# Patient Record
Sex: FEMALE | Race: BLACK OR AFRICAN AMERICAN | Employment: UNEMPLOYED | URBAN - METROPOLITAN AREA
[De-identification: names, ages, dates, MRNs, and addresses within clinical notes are randomized per-mention and may not be internally consistent; named-entity substitution may affect disease eponyms.]

---

## 2018-03-20 ENCOUNTER — HOSPITAL ENCOUNTER (OUTPATIENT)
Dept: MRI IMAGING | Age: 33
Discharge: HOME | End: 2018-03-22
Payer: COMMERCIAL

## 2018-03-20 DIAGNOSIS — Z80.3 FAMILY HISTORY OF BREAST CANCER: ICD-10-CM

## 2018-03-20 DIAGNOSIS — R92.2 DENSE BREAST TISSUE ON MAMMOGRAM: ICD-10-CM

## 2018-03-20 DIAGNOSIS — Z80.41 FAMILY HISTORY OF OVARIAN CANCER: ICD-10-CM

## 2018-03-20 PROCEDURE — A9577 INJ MULTIHANCE: HCPCS | Performed by: RADIOLOGY

## 2018-03-20 PROCEDURE — C8908 MRI W/O FOL W/CONT, BREAST,: HCPCS

## 2018-03-20 PROCEDURE — 6360000004 HC RX CONTRAST MEDICATION: Performed by: RADIOLOGY

## 2018-03-20 RX ADMIN — GADOBENATE DIMEGLUMINE 20 ML: 529 INJECTION, SOLUTION INTRAVENOUS at 13:31

## 2018-03-27 ENCOUNTER — TELEPHONE (OUTPATIENT)
Dept: BREAST CENTER | Age: 33
End: 2018-03-27

## 2018-03-27 DIAGNOSIS — R92.2 DENSE BREAST TISSUE ON MAMMOGRAM: ICD-10-CM

## 2018-03-27 DIAGNOSIS — Z91.89 AT HIGH RISK FOR BREAST CANCER: Primary | ICD-10-CM

## 2018-03-27 DIAGNOSIS — Z80.3 FAMILY HISTORY OF BREAST CANCER IN FIRST DEGREE RELATIVE: ICD-10-CM

## 2018-03-27 NOTE — TELEPHONE ENCOUNTER
Reviewed breast MRI report with Silke Sampson NP. Patient will have a mammogram in 6 months as recommended on the breast MRI. Discussed results and plan with patient. She verbalizes her understanding. Mammogram will be scheduled in September 2018.

## 2018-04-12 ENCOUNTER — NURSE ONLY (OUTPATIENT)
Dept: CARDIOLOGY CLINIC | Age: 33
End: 2018-04-12

## 2018-04-12 DIAGNOSIS — R00.2 HEART PALPITATIONS: Primary | ICD-10-CM

## 2018-05-03 DIAGNOSIS — R00.2 HEART PALPITATIONS: ICD-10-CM

## 2018-05-17 ENCOUNTER — OFFICE VISIT (OUTPATIENT)
Dept: CARDIOLOGY CLINIC | Age: 33
End: 2018-05-17
Payer: COMMERCIAL

## 2018-05-17 VITALS
HEIGHT: 62 IN | BODY MASS INDEX: 26.24 KG/M2 | WEIGHT: 142.6 LBS | HEART RATE: 97 BPM | RESPIRATION RATE: 16 BRPM | DIASTOLIC BLOOD PRESSURE: 62 MMHG | SYSTOLIC BLOOD PRESSURE: 108 MMHG

## 2018-05-17 DIAGNOSIS — R00.2 PALPITATIONS: Primary | ICD-10-CM

## 2018-05-17 DIAGNOSIS — R07.89 ATYPICAL CHEST PAIN: ICD-10-CM

## 2018-05-17 PROBLEM — I26.99 ACUTE PULMONARY EMBOLISM (HCC): Status: ACTIVE | Noted: 2018-05-17

## 2018-05-17 PROCEDURE — 99244 OFF/OP CNSLTJ NEW/EST MOD 40: CPT | Performed by: INTERNAL MEDICINE

## 2018-05-17 PROCEDURE — 93000 ELECTROCARDIOGRAM COMPLETE: CPT | Performed by: INTERNAL MEDICINE

## 2018-06-20 ENCOUNTER — HOSPITAL ENCOUNTER (OUTPATIENT)
Age: 33
Discharge: HOME | End: 2018-06-22
Payer: COMMERCIAL

## 2018-06-20 LAB
ALBUMIN SERPL-MCNC: 4.2 G/DL (ref 3.5–5.2)
ALP BLD-CCNC: 48 U/L (ref 35–104)
ALT SERPL-CCNC: 16 U/L (ref 0–32)
ANION GAP SERPL CALCULATED.3IONS-SCNC: 15 MMOL/L (ref 7–16)
AST SERPL-CCNC: 14 U/L (ref 0–31)
BASOPHILS ABSOLUTE: 0.06 E9/L (ref 0–0.2)
BASOPHILS RELATIVE PERCENT: 1.1 % (ref 0–2)
BILIRUB SERPL-MCNC: <0.2 MG/DL (ref 0–1.2)
BUN BLDV-MCNC: 9 MG/DL (ref 6–20)
CALCIUM SERPL-MCNC: 9 MG/DL (ref 8.6–10.2)
CHLORIDE BLD-SCNC: 102 MMOL/L (ref 98–107)
CHOLESTEROL, TOTAL: 159 MG/DL (ref 0–199)
CO2: 23 MMOL/L (ref 22–29)
CREAT SERPL-MCNC: 0.7 MG/DL (ref 0.5–1)
EOSINOPHILS ABSOLUTE: 0.23 E9/L (ref 0.05–0.5)
EOSINOPHILS RELATIVE PERCENT: 4.2 % (ref 0–6)
GFR AFRICAN AMERICAN: >60
GFR NON-AFRICAN AMERICAN: >60 ML/MIN/1.73
GLUCOSE BLD-MCNC: 84 MG/DL (ref 74–109)
HCT VFR BLD CALC: 41.7 % (ref 34–48)
HDLC SERPL-MCNC: 41 MG/DL
HEMOGLOBIN: 13.5 G/DL (ref 11.5–15.5)
IMMATURE GRANULOCYTES #: 0.02 E9/L
IMMATURE GRANULOCYTES %: 0.4 % (ref 0–5)
LDL CHOLESTEROL CALCULATED: 100 MG/DL (ref 0–99)
LYMPHOCYTES ABSOLUTE: 1.79 E9/L (ref 1.5–4)
LYMPHOCYTES RELATIVE PERCENT: 32.4 % (ref 20–42)
MCH RBC QN AUTO: 29.9 PG (ref 26–35)
MCHC RBC AUTO-ENTMCNC: 32.4 % (ref 32–34.5)
MCV RBC AUTO: 92.3 FL (ref 80–99.9)
MONOCYTES ABSOLUTE: 0.82 E9/L (ref 0.1–0.95)
MONOCYTES RELATIVE PERCENT: 14.9 % (ref 2–12)
NEUTROPHILS ABSOLUTE: 2.6 E9/L (ref 1.8–7.3)
NEUTROPHILS RELATIVE PERCENT: 47 % (ref 43–80)
PDW BLD-RTO: 13.4 FL (ref 11.5–15)
PLATELET # BLD: 226 E9/L (ref 130–450)
PMV BLD AUTO: 10.9 FL (ref 7–12)
POTASSIUM SERPL-SCNC: 3.5 MMOL/L (ref 3.5–5)
RBC # BLD: 4.52 E12/L (ref 3.5–5.5)
SODIUM BLD-SCNC: 140 MMOL/L (ref 132–146)
TOTAL PROTEIN: 7.9 G/DL (ref 6.4–8.3)
TRIGL SERPL-MCNC: 90 MG/DL (ref 0–149)
TSH SERPL DL<=0.05 MIU/L-ACNC: 3.1 UIU/ML (ref 0.27–4.2)
VITAMIN D 25-HYDROXY: 16 NG/ML (ref 30–100)
VLDLC SERPL CALC-MCNC: 18 MG/DL
WBC # BLD: 5.5 E9/L (ref 4.5–11.5)

## 2018-06-20 PROCEDURE — 80061 LIPID PANEL: CPT

## 2018-06-20 PROCEDURE — 80053 COMPREHEN METABOLIC PANEL: CPT

## 2018-06-20 PROCEDURE — 85025 COMPLETE CBC W/AUTO DIFF WBC: CPT

## 2018-06-20 PROCEDURE — 84443 ASSAY THYROID STIM HORMONE: CPT

## 2018-06-20 PROCEDURE — 82306 VITAMIN D 25 HYDROXY: CPT

## 2018-10-09 ENCOUNTER — TELEPHONE (OUTPATIENT)
Dept: BREAST CENTER | Age: 33
End: 2018-10-09

## 2018-10-31 ENCOUNTER — TELEPHONE (OUTPATIENT)
Dept: BREAST CENTER | Age: 33
End: 2018-10-31

## 2018-12-13 ENCOUNTER — HOSPITAL ENCOUNTER (EMERGENCY)
Age: 33
Discharge: HOME | End: 2018-12-13
Payer: COMMERCIAL

## 2018-12-13 ENCOUNTER — APPOINTMENT (OUTPATIENT)
Dept: GENERAL RADIOLOGY | Age: 33
End: 2018-12-13
Payer: COMMERCIAL

## 2018-12-13 VITALS
RESPIRATION RATE: 16 BRPM | DIASTOLIC BLOOD PRESSURE: 80 MMHG | BODY MASS INDEX: 26.16 KG/M2 | TEMPERATURE: 98.2 F | SYSTOLIC BLOOD PRESSURE: 128 MMHG | OXYGEN SATURATION: 99 % | HEART RATE: 114 BPM | WEIGHT: 143 LBS

## 2018-12-13 DIAGNOSIS — R07.89 CHEST WALL PAIN: Primary | ICD-10-CM

## 2018-12-13 PROCEDURE — 71101 X-RAY EXAM UNILAT RIBS/CHEST: CPT

## 2018-12-13 PROCEDURE — 99212 OFFICE O/P EST SF 10 MIN: CPT

## 2018-12-13 RX ORDER — CYCLOBENZAPRINE HCL 10 MG
10 TABLET ORAL 2 TIMES DAILY PRN
Qty: 14 TABLET | Refills: 0 | Status: SHIPPED | OUTPATIENT
Start: 2018-12-13

## 2018-12-13 RX ORDER — PREDNISONE 10 MG/1
TABLET ORAL
Qty: 14 TABLET | Refills: 0 | Status: SHIPPED | OUTPATIENT
Start: 2018-12-13

## 2022-02-02 ENCOUNTER — HOSPITAL ENCOUNTER (EMERGENCY)
Age: 37
Discharge: HOME | End: 2022-02-02
Payer: COMMERCIAL

## 2022-02-02 ENCOUNTER — APPOINTMENT (OUTPATIENT)
Dept: GENERAL RADIOLOGY | Age: 37
End: 2022-02-02
Payer: COMMERCIAL

## 2022-02-02 VITALS
RESPIRATION RATE: 16 BRPM | HEART RATE: 91 BPM | OXYGEN SATURATION: 96 % | SYSTOLIC BLOOD PRESSURE: 130 MMHG | BODY MASS INDEX: 22.86 KG/M2 | TEMPERATURE: 99.1 F | DIASTOLIC BLOOD PRESSURE: 77 MMHG | WEIGHT: 125 LBS

## 2022-02-02 DIAGNOSIS — S80.01XA CONTUSION OF RIGHT KNEE, INITIAL ENCOUNTER: Primary | ICD-10-CM

## 2022-02-02 PROCEDURE — 99211 OFF/OP EST MAY X REQ PHY/QHP: CPT

## 2022-02-02 PROCEDURE — 73560 X-RAY EXAM OF KNEE 1 OR 2: CPT

## 2022-02-02 NOTE — ED PROVIDER NOTES
[facility]  Department of Emergency Medicine   ED  Encounter Note  Admit Date/RoomTime: 2022  5:57 PM  ED Room:   NAME: Nic Kunz  : 1985  MRN: [id number]     Chief Complaint:  Knee Injury (hit in the right knee by her tire iron which fell out of her car while she was cleaning the car about 1 PM)    [facility] is a 29 y.o. female who presents to the ED with right knee injury and pain. Patient states about 5 hours ago she was cleaning out her car. Went to pull something in the tire iron came flying out of the car. States that it hit her directly in the right knee. She presents with pain and swelling and a bruise to the right knee. States it is worse anytime she stands or walks. Symptoms are moderate in severity. She states she tried soaking the knee in the tub earlier, without much relief. ROS   Pertinent positives and negatives are stated within HPI, all other systems reviewed and are negative. Past Medical History:  has a past medical history of Abnormal Pap smear, Cancer ([address] Palpitations, Pulmonary emboli ([address] Thrombophilia ([address] Tobacco abuse, and Tobacco use disorder affecting pregnancy, antepartum. Surgical History:  has a past surgical history that includes LEEP (); Dilation & curettage (); Hysterectomy; Tonsillectomy (); other surgical history; and Bunionectomy (Left). Social History:  reports that she has been smoking cigarettes. She has a 12.00 pack-year smoking history. She has never used smokeless tobacco. She reports that she does not drink alcohol and does not use drugs. Family History: family history includes Coronary Art Dis in her father and paternal grandfather; Elevated Lipids in her father; Heart Attack in her maternal grandmother and paternal grandfather; Heart Failure in her maternal grandmother; Other in her father and mother.      Allergies: Patient has no known allergies. PHYSICAL EXAM   Oxygen Saturation Interpretation: Normal on room air analysis. ED Triage Vitals [02/02/22 1758]   BP Temp Temp src Pulse Resp SpO2 Height Weight   130/77 99.1 °F (37.3 °C) -- 91 16 96 % -- 125 lb (56.7 kg)       General:  NAD. Alert and Oriented. Well-appearing. Skin:  Warm, dry. No rashes. Head:  Normocephalic. Atraumatic. Eyes:  EOMI. Conjunctiva normal.  ENT:  Oral mucosa moist.  Airway patent. Neck:  Supple. Normal ROM. Respiratory:  No respiratory distress. No labored breathing. Lungs clear without rales, rhonchi or wheezing. Cardiovascular:  Regular rate. No Murmur. No peripheral edema. Extremities warm and good color. Extremities: Patient does have a very small bruise and localized area of swelling to the medial aspect of her right knee. There is no overall joint effusion. Patella is aligned and nontender to palpation. Flexion and extension is intact to the right knee. Back:  Normal ROM. Nontender to palpation. Neuro:  Alert and Oriented to person, place, time and situation. Normal LOC. Moves all extremities. Speech fluent. Psych:  Calm and Cooperative. Normal thought process. Normal judgement. Lab / Imaging Results   (All laboratory and radiology results have been personally reviewed by myself)  Labs:  No results found for this visit on 02/02/22. Imaging: All Radiology results interpreted by Radiologist unless otherwise noted. XR KNEE RIGHT (1-2 VIEWS)   Final Result   No acute abnormality of the knee. ED Course / Medical Decision Making   Medications - No data to display     Re-examination:  2/2/22       Time:   Patients condition . Consult(s):   None    Procedure(s):   none    MDM:   Ace wrap applied. Patient has Motrin at home she can take as needed.     Plan of Care/Counseling:  Physician Assistant on duty reviewed today's visit with the patient in addition to providing specific details for the plan of care and counseling regarding the diagnosis and prognosis. Questions are answered at this time and are agreeable with the plan. ASSESSMENT     1. Contusion of right knee, initial encounter New Problem     PLAN   Discharged home. Patient condition is good    New Medications     New Prescriptions    No medications on file     Electronically signed by BRYAN Rose   DD: 2/2/22  **This report was transcribed using voice recognition software. Every effort was made to ensure accuracy; however, inadvertent computerized transcription errors may be present.   END OF ED PROVIDER NOTE       Ga Rose  02/02/22 0966

## 2024-06-25 ENCOUNTER — TELEPHONE (OUTPATIENT)
Dept: PULMONOLOGY | Age: 39
End: 2024-06-25

## 2024-06-25 ENCOUNTER — OFFICE VISIT (OUTPATIENT)
Dept: PULMONOLOGY | Age: 39
End: 2024-06-25
Payer: COMMERCIAL

## 2024-06-25 VITALS
HEIGHT: 63 IN | TEMPERATURE: 97.9 F | DIASTOLIC BLOOD PRESSURE: 84 MMHG | BODY MASS INDEX: 25.52 KG/M2 | SYSTOLIC BLOOD PRESSURE: 142 MMHG | WEIGHT: 144 LBS | HEART RATE: 92 BPM | RESPIRATION RATE: 18 BRPM | OXYGEN SATURATION: 98 %

## 2024-06-25 DIAGNOSIS — Z86.711 HISTORY OF PULMONARY EMBOLISM: ICD-10-CM

## 2024-06-25 DIAGNOSIS — O99.330 TOBACCO USE DISORDER AFFECTING PREGNANCY, ANTEPARTUM: ICD-10-CM

## 2024-06-25 DIAGNOSIS — Z15.89 PAI-1 4G/4G GENOTYPE: ICD-10-CM

## 2024-06-25 DIAGNOSIS — Z15.89 MTHFR MUTATION: ICD-10-CM

## 2024-06-25 DIAGNOSIS — R05.3 CHRONIC COUGH: ICD-10-CM

## 2024-06-25 DIAGNOSIS — Q33.0 CONGENITAL CYSTIC DISEASE OF LUNG: Primary | ICD-10-CM

## 2024-06-25 LAB
EXPIRATORY TIME: NORMAL
FEF 25-75% %PRED-PRE: NORMAL
FEF 25-75% PRED: NORMAL
FEF 25-75-PRE: NORMAL
FEV1 %PRED-PRE: 87 %
FEV1 PRED: 2.97 L
FEV1/FVC %PRED-PRE: 88 %
FEV1/FVC PRED: 83 %
FEV1/FVC: 73 %
FEV1: 2.59 L
FVC %PRED-PRE: 98 %
FVC PRED: 3.59 L
FVC: 3.53 L
PEF %PRED-PRE: NORMAL
PEF PRED: NORMAL
PEF-PRE: NORMAL

## 2024-06-25 PROCEDURE — 94010 BREATHING CAPACITY TEST: CPT | Performed by: INTERNAL MEDICINE

## 2024-06-25 PROCEDURE — 99406 BEHAV CHNG SMOKING 3-10 MIN: CPT | Performed by: INTERNAL MEDICINE

## 2024-06-25 RX ORDER — BENZONATATE 100 MG/1
100 CAPSULE ORAL 3 TIMES DAILY PRN
Qty: 30 CAPSULE | Refills: 3 | Status: SHIPPED | OUTPATIENT
Start: 2024-06-25 | End: 2024-08-04

## 2024-06-25 RX ORDER — BUDESONIDE AND FORMOTEROL FUMARATE DIHYDRATE 160; 4.5 UG/1; UG/1
AEROSOL RESPIRATORY (INHALATION)
COMMUNITY
Start: 2024-04-26

## 2024-06-25 ASSESSMENT — PULMONARY FUNCTION TESTS
FVC_PREDICTED: 3.59
FEV1/FVC: 73
FEV1: 2.59
FEV1/FVC_PERCENT_PREDICTED_PRE: 88
FVC_PERCENT_PREDICTED_PRE: 98
FEV1/FVC_PREDICTED: 83
FEV1_PREDICTED: 2.97
FVC: 3.53
FEV1_PERCENT_PREDICTED_PRE: 87

## 2024-06-25 NOTE — TELEPHONE ENCOUNTER
Mailed letter to patient to inform her of the CT of the Chest that is scheduled for her  at [facility]  . This test is scheduled on  Wednesday, July17, 2024 at  1:30 pm. Please arrive 30 minutes prior to appointment time.    No Test Prep is needed       Mailed letter to patient to inform her of the PFT that is scheduled for her  at [facility]  . This test is scheduled on  Wednesday, July24, 2024 at  10:30 am. Please arrive 30 minutes prior to appointment time.    The prep for this test is to not have any caffeine for 24 hours prior to testing and no respiratory medications for at least 4 hours prior to testing time.

## 2024-06-25 NOTE — PROGRESS NOTES
A CT Chest were ordered as well as blood work. A four (4) week follow up was scheduled  
gasping for breath?   YES NO You have been told you snore? If yes, is your snoring disruptive to other?   YES NO You experience restless sleep?   YES NO You wake up with headache or unrefreshed?   YES NO You have trouble with memory or concentration?   YES NO You experience fatigue?   YES NO You drive drowsy or experience near car accidents due to being tired?   YES NO You struggle to stay awake during the daytime?   YES NO Do you experience difficulty with work performance due to sleepiness?       PFT: DATE 6/25/2024     PRE PRE % Z-Score POST POST% Z-Score % Change   FEV1/FVC 0.73 88%        FEV1 2.59L 87%        FVC 3.53L 98%        DLCO            Interpretation:    NIOX:    MMRC:  Dyspnea only with strenuous exercise  0   Dyspnea when hurrying or walking up a slight hill  1   Walks slower than people of the same age because of dyspnea or has to stop for breath when walking at own pace  2   Stops for breath after walking 100 yards (91 m) or after a few minutes  3   Too dyspneic to leave house or breathless when dressing  4     - [facility] Chest 3/22/2018: [facility]   No acute process    - CTA Chest 4/19/2024: [facility]  No evidence for pulmonary embolism to subsegmental level.  INterval increase in number of multiple thin-walled cysts throughout B/L lungs with upper lobe predominance, possibly related to DELATORRE or emphysema.  No intrathoracic lymphadenopathy.    ASSESSMENT:    ICD-10-CM    1. Congenital cystic disease of lung  Q33.0 Spirometry Without Bronchodilator     MISCELLANEOUS SENDOUT VEGF-D     Full PFT Study With Bronchodilator     CBC with Auto Differential     Allergen, Respiratory, Region 5 Panel     Alpha-1-Antitrypsin w Phenotype     IgE     IgG, IgA, IgM     HIV Screen     Hepatitis Panel, Acute     MISCELLANEOUS SENDOUT CD1a for Langerhans Cell Histiocytosis     MISCELLANEOUS SENDOUT FLCN Gene for Brittni Nick Marina Syndrome     Chlamydia Antibodies IgG

## 2024-06-27 ENCOUNTER — HOSPITAL ENCOUNTER (OUTPATIENT)
Age: 39
Discharge: HOME | End: 2024-06-27
Payer: COMMERCIAL

## 2024-06-27 DIAGNOSIS — Q33.0 CONGENITAL CYSTIC DISEASE OF LUNG: ICD-10-CM

## 2024-06-27 LAB
BASOPHILS # BLD: 0.1 K/UL (ref 0–0.2)
BASOPHILS NFR BLD: 1 % (ref 0–2)
CRP SERPL HS-MCNC: <3 MG/L (ref 0–5)
EOSINOPHIL # BLD: 0.14 K/UL (ref 0.05–0.5)
EOSINOPHILS RELATIVE PERCENT: 2 % (ref 0–6)
ERYTHROCYTE [DISTWIDTH] IN BLOOD BY AUTOMATED COUNT: 13.4 % (ref 11.5–15)
ERYTHROCYTE [SEDIMENTATION RATE] IN BLOOD BY WESTERGREN METHOD: 25 MM/HR (ref 0–20)
HCT VFR BLD AUTO: 39.7 % (ref 34–48)
HGB BLD-MCNC: 13.8 G/DL (ref 11.5–15.5)
IGA SERPL-MCNC: 175 MG/DL (ref 70–400)
IGG SERPL-MCNC: 1651 MG/DL (ref 700–1600)
IGM SERPL-MCNC: 119 MG/DL (ref 40–230)
IMM GRANULOCYTES # BLD AUTO: <0.03 K/UL (ref 0–0.58)
IMM GRANULOCYTES NFR BLD: 0 % (ref 0–5)
LYMPHOCYTES NFR BLD: 2.84 K/UL (ref 1.5–4)
LYMPHOCYTES RELATIVE PERCENT: 33 % (ref 20–42)
MCH RBC QN AUTO: 31.2 PG (ref 26–35)
MCHC RBC AUTO-ENTMCNC: 34.8 G/DL (ref 32–34.5)
MCV RBC AUTO: 89.8 FL (ref 80–99.9)
MONOCYTES NFR BLD: 0.8 K/UL (ref 0.1–0.95)
MONOCYTES NFR BLD: 9 % (ref 2–12)
NEUTROPHILS NFR BLD: 55 % (ref 43–80)
NEUTS SEG NFR BLD: 4.72 K/UL (ref 1.8–7.3)
PLATELET # BLD AUTO: 218 K/UL (ref 130–450)
PMV BLD AUTO: 10.9 FL (ref 7–12)
RBC # BLD AUTO: 4.42 M/UL (ref 3.5–5.5)
RHEUMATOID FACT SER NEPH-ACNC: 27 IU/ML (ref 0–13)
WBC OTHER # BLD: 8.6 K/UL (ref 4.5–11.5)

## 2024-06-27 PROCEDURE — 86631 CHLAMYDIA ANTIBODY: CPT

## 2024-06-27 PROCEDURE — 85652 RBC SED RATE AUTOMATED: CPT

## 2024-06-27 PROCEDURE — 86039 ANTINUCLEAR ANTIBODIES (ANA): CPT

## 2024-06-27 PROCEDURE — 85025 COMPLETE CBC W/AUTO DIFF WBC: CPT

## 2024-06-27 PROCEDURE — 82104 ALPHA-1-ANTITRYPSIN PHENO: CPT

## 2024-06-27 PROCEDURE — 82785 ASSAY OF IGE: CPT

## 2024-06-27 PROCEDURE — 86632 CHLAMYDIA IGM ANTIBODY: CPT

## 2024-06-27 PROCEDURE — 86003 ALLG SPEC IGE CRUDE XTRC EA: CPT

## 2024-06-27 PROCEDURE — 82103 ALPHA-1-ANTITRYPSIN TOTAL: CPT

## 2024-06-27 PROCEDURE — 86331 IMMUNODIFFUSION OUCHTERLONY: CPT

## 2024-06-27 PROCEDURE — 80074 ACUTE HEPATITIS PANEL: CPT

## 2024-06-27 PROCEDURE — 86606 ASPERGILLUS ANTIBODY: CPT

## 2024-06-27 PROCEDURE — 83516 IMMUNOASSAY NONANTIBODY: CPT

## 2024-06-27 PROCEDURE — 86140 C-REACTIVE PROTEIN: CPT

## 2024-06-27 PROCEDURE — 87389 HIV-1 AG W/HIV-1&-2 AB AG IA: CPT

## 2024-06-27 PROCEDURE — 86038 ANTINUCLEAR ANTIBODIES: CPT

## 2024-06-27 PROCEDURE — 86036 ANCA SCREEN EACH ANTIBODY: CPT

## 2024-06-27 PROCEDURE — 36415 COLL VENOUS BLD VENIPUNCTURE: CPT

## 2024-06-27 PROCEDURE — 86431 RHEUMATOID FACTOR QUANT: CPT

## 2024-06-27 PROCEDURE — 82784 ASSAY IGA/IGD/IGG/IGM EACH: CPT

## 2024-06-28 LAB
ANA SER QL IA: NEGATIVE
HAV IGM SERPL QL IA: NONREACTIVE
HBV CORE IGM SERPL QL IA: NONREACTIVE
HBV SURFACE AG SERPL QL IA: NONREACTIVE
HCV AB SERPL QL IA: NONREACTIVE
HIV 1+2 AB+HIV1 P24 AG SERPL QL IA: NONREACTIVE
SEND OUT REPORT: NORMAL
TEST NAME: NORMAL

## 2024-06-29 LAB
A ALTERNATA IGE QN: NORMAL KU/L (ref 0–0.34)
A FUMIGATUS IGE QN: NORMAL KU/L (ref 0–0.34)
ALLERGEN BIRCH IGE: NORMAL KU/L (ref 0–0.34)
BERMUDA GRASS IGE QN: NORMAL KU/L (ref 0–0.34)
BOXELDER IGE QN: NORMAL KU/L (ref 0–0.34)
C HERBARUM IGE QN: NORMAL KUL/L (ref 0–0.34)
CALIF WALNUT POLN IGE QN: NORMAL KU/L (ref 0–0.34)
CAT DANDER IGE QN: NORMAL KU/L (ref 0–0.34)
CMN PIGWEED IGE QN: NORMAL KU/L (ref 0–0.34)
COMMON RAGWEED IGE QN: NORMAL KU/L (ref 0–0.34)
COTTONWOOD IGE QN: NORMAL KU/L (ref 0–0.34)
D FARINAE IGE QN: NORMAL KU/L (ref 0–0.34)
D PTERONYSS IGE QN: NORMAL KU/L (ref 0–0.34)
DOG DANDER IGE QN: NORMAL KU/L (ref 0–0.34)
IGE SERPL-ACNC: 18 IU/ML (ref 0–100)
LONDON PLANE IGE QN: NORMAL KU/L (ref 0–0.34)
M RACEMOSUS IGE QN: NORMAL KU/L (ref 0–0.34)
MOUSE EPITH IGE QN: NORMAL KU/L (ref 0–0.34)
MT JUNIPER IGE QN: NORMAL KU/L (ref 0–0.34)
P NOTATUM IGE QN: NORMAL KU/L (ref 0–0.34)
PECAN/HICK TREE IGE QN: NORMAL KU/L (ref 0–0.34)
ROACH IGE QN: NORMAL KU/L (ref 0–0.34)
SALTWORT IGE QN: NORMAL KU/L (ref 0–0.34)
SEND OUT REPORT: NORMAL
SHEEP SORREL IGE QN: NORMAL KU/L (ref 0–0.34)
TEST NAME: NORMAL
TIMOTHY IGE QN: NORMAL KU/L (ref 0–0.34)
WHITE ASH IGE QN: NORMAL KU/L (ref 0–0.34)
WHITE ELM IGE QN: NORMAL KU/L (ref 0–0.34)
WHITE MULBERRY IGE QN: NORMAL KU/L (ref 0–0.34)
WHITE OAK IGE QN: NORMAL KU/L (ref 0–0.34)

## 2024-07-01 LAB
ALPHA-1 ANTITRYPSIN PHENOTYPE: NORMAL
ALPHA-1 ANTITRYPSIN: 160 MG/DL (ref 90–200)
ANCA AB PATTERN SER IF-IMP: NORMAL
ANCA IGG TITR SER IF: NORMAL {TITER}
CHLAMYDIA PNEUMONIAE IGG ANTIBODY: ABNORMAL
CHLAMYDIA PNEUMONIAE IGM ANTIBODY: ABNORMAL
CHLAMYDIA PSITTACI IGG ANTIBODY: ABNORMAL
CHLAMYDIA PSITTACI IGM ANTIBODY: ABNORMAL
CHLAMYDIA TRACHOMATIS IGG ANTIBODY: ABNORMAL
CHLAMYDIA TRACHOMATIS IGM ANTIBODY: ABNORMAL
DEPRECATED S PNEUM 1 IGG SER-MCNC: 0 AU/ML (ref 0–19)
SERINE PROTEASE 3, IGG: 0 AU/ML (ref 0–19)

## 2024-07-02 LAB
A ALTERNATA IGE QN: <0.1 KU/L (ref 0–0.34)
A FUMIGATUS IGE QN: <0.1 KU/L (ref 0–0.34)
ALLERGEN BIRCH IGE: <0.1 KU/L (ref 0–0.34)
BERMUDA GRASS IGE QN: <0.1 KU/L (ref 0–0.34)
BOXELDER IGE QN: <0.1 KU/L (ref 0–0.34)
C HERBARUM IGE QN: <0.1 KUL/L (ref 0–0.34)
CALIF WALNUT POLN IGE QN: <0.1 KU/L (ref 0–0.34)
CAT DANDER IGE QN: <0.1 KU/L (ref 0–0.34)
CMN PIGWEED IGE QN: <0.1 KU/L (ref 0–0.34)
COMMON RAGWEED IGE QN: <0.1 KU/L (ref 0–0.34)
COTTONWOOD IGE QN: <0.1 KU/L (ref 0–0.34)
D FARINAE IGE QN: <0.1 KU/L (ref 0–0.34)
D PTERONYSS IGE QN: <0.1 KU/L (ref 0–0.34)
DOG DANDER IGE QN: <0.1 KU/L (ref 0–0.34)
IGE SERPL-ACNC: 18 IU/ML (ref 0–100)
LONDON PLANE IGE QN: <0.1 KU/L (ref 0–0.34)
M RACEMOSUS IGE QN: <0.1 KU/L (ref 0–0.34)
MOUSE EPITH IGE QN: <0.1 KU/L (ref 0–0.34)
MT JUNIPER IGE QN: <0.1 KU/L (ref 0–0.34)
P NOTATUM IGE QN: <0.1 KU/L (ref 0–0.34)
PECAN/HICK TREE IGE QN: <0.1 KU/L (ref 0–0.34)
ROACH IGE QN: <0.1 KU/L (ref 0–0.34)
SALTWORT IGE QN: <0.1 KU/L (ref 0–0.34)
SHEEP SORREL IGE QN: <0.1 KU/L (ref 0–0.34)
TIMOTHY IGE QN: <0.1 KU/L (ref 0–0.34)
WHITE ASH IGE QN: <0.1 KU/L (ref 0–0.34)
WHITE ELM IGE QN: <0.1 KU/L (ref 0–0.34)
WHITE MULBERRY IGE QN: <0.1 KU/L (ref 0–0.34)
WHITE OAK IGE QN: <0.1 KU/L (ref 0–0.34)

## 2024-07-03 LAB
SEND OUT REPORT: NORMAL
TEST NAME: NORMAL

## 2024-07-06 LAB
A FUMIGATUS1 AB SER QL ID: NORMAL
A FUMIGATUS6 AB SER QL ID: NORMAL
A PULLULANS AB SER QL ID: NORMAL
PIGEON SERUM AB QL ID: NORMAL
S RECTIVIRGULA AB SER QL ID: NORMAL

## 2024-07-09 LAB
SEND OUT REPORT: NORMAL
TEST NAME: NORMAL

## 2024-07-17 ENCOUNTER — HOSPITAL ENCOUNTER (OUTPATIENT)
Dept: CT IMAGING | Age: 39
Discharge: HOME | End: 2024-07-17
Attending: INTERNAL MEDICINE
Payer: COMMERCIAL

## 2024-07-17 DIAGNOSIS — Q33.0 CONGENITAL CYSTIC DISEASE OF LUNG: ICD-10-CM

## 2024-07-17 PROCEDURE — 71250 CT THORAX DX C-: CPT

## 2024-07-24 ENCOUNTER — HOSPITAL ENCOUNTER (OUTPATIENT)
Dept: PULMONOLOGY | Age: 39
Discharge: HOME | End: 2024-07-24
Attending: INTERNAL MEDICINE
Payer: COMMERCIAL

## 2024-07-24 DIAGNOSIS — Q33.0 CONGENITAL CYSTIC DISEASE OF LUNG: ICD-10-CM

## 2024-07-24 PROCEDURE — 94729 DIFFUSING CAPACITY: CPT

## 2024-07-24 PROCEDURE — 94060 EVALUATION OF WHEEZING: CPT

## 2024-07-24 PROCEDURE — 94726 PLETHYSMOGRAPHY LUNG VOLUMES: CPT

## 2024-07-25 PROBLEM — Q33.0: Status: ACTIVE | Noted: 2024-07-25

## 2024-07-25 NOTE — PROCEDURES
[place] [id number]                           PULMONARY FUNCTION      PATIENT NAME: ELLEN HASTINGS             : 1985  MED REC NO: [id number]                        ROOM:   ACCOUNT NO: [id number]                       ADMIT DATE: 2024  PROVIDER: Keke Turner MD      DATE OF PROCEDURE: 2024    ORDERING PHYSICIAN:  Dr. Keke Turner.    INTERPRETING PHYSICIAN:  Dr. Keke Turner.    CLINICAL INDICATION:  Dyspnea after exertion.    PURPOSE:  Diagnostic.    MEDICATIONS:  Tessalon Perles, albuterol, Symbicort.    LUNG MECHANICS:  There is no obstructive or restrictive component of lung disease according to graphic and numeric representation of patient's lung volumes.  Pattern of flow volume loop supports this.  FEV1/FVC best post bronchodilator 0.77.  FEV1 of 2.73 L, 92% of predicted post bronchodilator equating to a 230 mL difference or 9% change.  Z-score -0.66.  FVC 3.52 L, 98% of predicted post bronchodilator equating to a 30 mL difference or 0% change.  Z-score -0.14.    % of predicted.    Inspiratory phase of flow volume loop shows adequate upper airway laryngeal and pharyngeal function.  Expiratory phase of flow volume loop shows adequate chest wall strength.    LUNG VOLUMES:  Lung volumes are within normal limits and repeatable to about 2%-4%.    LUNG DIFFUSION:  DLCO corrected for alveolar volume is within normal limits at 81%.    CLINICAL IMPRESSION:  Clinically most compatible with mild airway asthma as denoted by reversibility with the FEV1 and the FVC above.  Clinical correlation is recommended.    Thank you very much for allowing me to participate in this patient's care.          KEKE TURNER MD      D:  2024 19:36:10     T:  2024 20:35:02     AMB/AQS  Job #:  [id number]     Doc#:  [id number]

## 2024-08-06 ENCOUNTER — OFFICE VISIT (OUTPATIENT)
Dept: PULMONOLOGY | Age: 39
End: 2024-08-06
Payer: COMMERCIAL

## 2024-08-06 VITALS
BODY MASS INDEX: 25.52 KG/M2 | DIASTOLIC BLOOD PRESSURE: 78 MMHG | WEIGHT: 144 LBS | SYSTOLIC BLOOD PRESSURE: 123 MMHG | OXYGEN SATURATION: 99 % | HEIGHT: 63 IN | HEART RATE: 92 BPM | TEMPERATURE: 97.5 F | RESPIRATION RATE: 18 BRPM

## 2024-08-06 DIAGNOSIS — Z72.0 TOBACCO USE: ICD-10-CM

## 2024-08-06 DIAGNOSIS — J43.2 CENTRILOBULAR EMPHYSEMA (HCC): ICD-10-CM

## 2024-08-06 DIAGNOSIS — J67.9 HYPERSENSITIVITY PNEUMONITIS (HCC): ICD-10-CM

## 2024-08-06 DIAGNOSIS — Z15.89 PAI-1 4G/4G GENOTYPE: ICD-10-CM

## 2024-08-06 DIAGNOSIS — R91.8 LUNG NODULES: ICD-10-CM

## 2024-08-06 DIAGNOSIS — Z15.89 MTHFR GENE MUTATION: ICD-10-CM

## 2024-08-06 DIAGNOSIS — R05.3 CHRONIC COUGH: ICD-10-CM

## 2024-08-06 DIAGNOSIS — G47.19 EXCESSIVE DAYTIME SLEEPINESS: ICD-10-CM

## 2024-08-06 DIAGNOSIS — J98.4 CYSTIC-BULLOUS DISEASE OF LUNG: Primary | ICD-10-CM

## 2024-08-06 PROCEDURE — 99213 OFFICE O/P EST LOW 20 MIN: CPT | Performed by: INTERNAL MEDICINE

## 2024-08-06 PROCEDURE — 4004F PT TOBACCO SCREEN RCVD TLK: CPT | Performed by: INTERNAL MEDICINE

## 2024-08-06 PROCEDURE — G8427 DOCREV CUR MEDS BY ELIG CLIN: HCPCS | Performed by: INTERNAL MEDICINE

## 2024-08-06 PROCEDURE — 3023F SPIROM DOC REV: CPT | Performed by: INTERNAL MEDICINE

## 2024-08-06 PROCEDURE — G8419 CALC BMI OUT NRM PARAM NOF/U: HCPCS | Performed by: INTERNAL MEDICINE

## 2024-08-06 PROCEDURE — 99406 BEHAV CHNG SMOKING 3-10 MIN: CPT | Performed by: INTERNAL MEDICINE

## 2024-08-06 RX ORDER — BUDESONIDE, GLYCOPYRROLATE, AND FORMOTEROL FUMARATE 160; 9; 4.8 UG/1; UG/1; UG/1
2 AEROSOL, METERED RESPIRATORY (INHALATION) 2 TIMES DAILY
Qty: 1 EACH | Refills: 3 | Status: SHIPPED | OUTPATIENT
Start: 2024-08-06

## 2024-08-06 RX ORDER — PREDNISONE 20 MG/1
20 TABLET ORAL DAILY
Status: SHIPPED | OUTPATIENT
Start: 2024-08-06 | End: 2024-09-05

## 2024-08-06 NOTE — PROGRESS NOTES
A home sleep study was ordered. Two (2) samples of Breztri were given to patient. Patient is to folow up in 6-8 weeks  
answered all questions to satisfaction..  30 Minutes of which greater than 50% was spent counseling or coordinating care.    My signature verifies the accuracy and relevance of my note, including documentation of information that has been copy pasted/forward, note templates, and Notewriter Macros.  - COVID-19 precautions  - Recommend yearly Influenza and appropriate pneumonia vaccinations.  - diet modifications and weight modifications as directed    Return in about 4 weeks (around 9/3/2024).    Thank you very much for allowing me to see this patient in consultation and follow up.    Care reviewed with nursing staff, medical and surgical specialty care, primary care and the patient's family as available. Restraints are ordered when the patient can do harm to him/herself by pulling out devices.    Asa Turner MD, M.D.

## 2024-08-08 ENCOUNTER — CLINICAL DOCUMENTATION (OUTPATIENT)
Dept: PULMONOLOGY | Age: 39
End: 2024-08-08

## 2024-08-08 DIAGNOSIS — J67.9 HYPERSENSITIVITY PNEUMONITIS (HCC): Primary | ICD-10-CM

## 2024-08-08 RX ORDER — PREDNISONE 20 MG/1
20 TABLET ORAL 2 TIMES DAILY
Qty: 30 TABLET | Refills: 1 | Status: SHIPPED | OUTPATIENT
Start: 2024-08-08 | End: 2024-09-07

## 2024-08-08 NOTE — PROGRESS NOTES
Patient called and updated with results.  C. Trachomatis results results discussed with patient.  Value of 1:40 in C. Trachomatis discussed, patient has no discharge or pain and patient will discuss with ob/gyn.    Prednisone 20 mg to be ordered again.    Follow up in 30 days.     Diagnosis Orders   1. Hypersensitivity pneumonitis (HCC)  predniSONE (DELTASONE) 20 MG tablet            Asa Turner MD  8/8/2024

## 2024-08-09 ENCOUNTER — TRANSCRIBE ORDERS (OUTPATIENT)
Dept: SLEEP CENTER | Age: 39
End: 2024-08-09

## 2024-08-09 DIAGNOSIS — G47.19 DAYTIME HYPERSOMNOLENCE: Primary | ICD-10-CM

## 2024-08-12 ENCOUNTER — HOSPITAL ENCOUNTER (OUTPATIENT)
Dept: SLEEP CENTER | Age: 39
Discharge: HOME | End: 2024-08-12
Attending: INTERNAL MEDICINE
Payer: COMMERCIAL

## 2024-08-12 DIAGNOSIS — G47.19 DAYTIME HYPERSOMNOLENCE: ICD-10-CM

## 2024-08-12 PROCEDURE — 95800 SLP STDY UNATTENDED: CPT

## 2024-10-28 ENCOUNTER — OFFICE VISIT (OUTPATIENT)
Dept: PULMONOLOGY | Age: 39
End: 2024-10-28
Payer: COMMERCIAL

## 2024-10-28 VITALS
RESPIRATION RATE: 20 BRPM | HEART RATE: 103 BPM | DIASTOLIC BLOOD PRESSURE: 90 MMHG | OXYGEN SATURATION: 97 % | SYSTOLIC BLOOD PRESSURE: 143 MMHG

## 2024-10-28 DIAGNOSIS — Z15.89 MTHFR GENE MUTATION: ICD-10-CM

## 2024-10-28 DIAGNOSIS — G47.19 EXCESSIVE DAYTIME SLEEPINESS: ICD-10-CM

## 2024-10-28 DIAGNOSIS — Z15.89 PAI-1 4G/5G GENOTYPE: ICD-10-CM

## 2024-10-28 DIAGNOSIS — R05.3 CHRONIC COUGH: ICD-10-CM

## 2024-10-28 DIAGNOSIS — J67.9 HYPERSENSITIVITY PNEUMONITIS (HCC): ICD-10-CM

## 2024-10-28 DIAGNOSIS — Z72.0 TOBACCO USE: ICD-10-CM

## 2024-10-28 DIAGNOSIS — Q33.0 CONGENITAL CYSTIC DISEASE OF LUNG: Primary | ICD-10-CM

## 2024-10-28 PROCEDURE — 4004F PT TOBACCO SCREEN RCVD TLK: CPT | Performed by: INTERNAL MEDICINE

## 2024-10-28 PROCEDURE — 99214 OFFICE O/P EST MOD 30 MIN: CPT | Performed by: INTERNAL MEDICINE

## 2024-10-28 PROCEDURE — G8419 CALC BMI OUT NRM PARAM NOF/U: HCPCS | Performed by: INTERNAL MEDICINE

## 2024-10-28 PROCEDURE — G8427 DOCREV CUR MEDS BY ELIG CLIN: HCPCS | Performed by: INTERNAL MEDICINE

## 2024-10-28 PROCEDURE — G8484 FLU IMMUNIZE NO ADMIN: HCPCS | Performed by: INTERNAL MEDICINE

## 2024-10-28 PROCEDURE — 99406 BEHAV CHNG SMOKING 3-10 MIN: CPT | Performed by: INTERNAL MEDICINE

## 2024-10-28 RX ORDER — BUDESONIDE AND FORMOTEROL FUMARATE DIHYDRATE 160; 4.5 UG/1; UG/1
2 AEROSOL RESPIRATORY (INHALATION) 2 TIMES DAILY
COMMUNITY

## 2024-10-28 NOTE — PROGRESS NOTES
[facility]    PULMONARY/CRITICAL CARE CONSULTATION NOTE    Patient: Bennett Sosa  MRN: [id number]  : 1985    Encounter Date: 10/28/2024  Encounter Time: 3:26 PM     PROBLEM LIST:  Patient Active Problem List   Diagnosis    Previous LEEP    Previous baby with Triploidy.    Tobacco use disorder affecting pregnancy, antepartum    Previous fetal EIF    Pregnant state, incidental    Palpitations    Atypical chest pain    Congenital cystic disease of lung     Reason for Consultation: Cystic Lung Disease    HPI:   Bennett Sosa is a 39 y.o. female with past medical history noted for above that presented to hospital for evaluation of cystic lung disease.    Patient admits to having cockatiel and chickens previously.  There is a family history of lung collapse in mother and one of the maternal cousins.    Patient had coughing fits that can last up to 40 minutes.  She continues to smoke.    Patient consumes 6 cups of coffee daily and 3 cans of pop.    She has no increased work of breathing, fevers, chills, chest pains, nausea, vomiting, diarrhea.    Social History: active tobacco use 1 PPD x 20 years, no alcohol use, no drug use     Occupational History:  - no employed  - no occupational exposures to asbestos, beryllium, silica      PAST MEDICAL HISTORY:     Past Medical History:   Diagnosis Date    Abnormal Pap smear     LEEP    Cancer (HCC)     cervical    Palpitations     Pulmonary emboli (HCC)     Thrombophilia (HCC)     Tobacco abuse     Tobacco use disorder affecting pregnancy, antepartum        PAST SURGICAL HISTORY:   Past Surgical History:   Procedure Laterality Date    BUNIONECTOMY Left     DILATION AND CURETTAGE      HYSTERECTOMY (CERVIX STATUS UNKNOWN)      LEEP      OTHER SURGICAL HISTORY      Assure device implanted; was then removed during hysterectomy    TONSILLECTOMY         FAMILY HISTORY:   Family History   Problem Relation Age of Onset    Other Mother         RSD

## 2025-03-06 ENCOUNTER — TELEPHONE (OUTPATIENT)
Dept: PULMONOLOGY | Age: 40
End: 2025-03-06

## 2025-03-06 NOTE — TELEPHONE ENCOUNTER
Letter sent in the mail to patient to inform of appointment change with Dr. Turner due to the most recent ICU/Consult schedules.

## 2025-05-29 ENCOUNTER — OFFICE VISIT (OUTPATIENT)
Age: 40
End: 2025-05-29
Payer: COMMERCIAL

## 2025-05-29 VITALS
HEART RATE: 74 BPM | SYSTOLIC BLOOD PRESSURE: 129 MMHG | RESPIRATION RATE: 20 BRPM | OXYGEN SATURATION: 98 % | DIASTOLIC BLOOD PRESSURE: 92 MMHG | TEMPERATURE: 98 F

## 2025-05-29 DIAGNOSIS — R05.3 CHRONIC COUGH: ICD-10-CM

## 2025-05-29 DIAGNOSIS — Z72.0 TOBACCO USE: ICD-10-CM

## 2025-05-29 DIAGNOSIS — R91.8 LUNG NODULES: ICD-10-CM

## 2025-05-29 DIAGNOSIS — Z15.89 PAI-1 4G/4G GENOTYPE: ICD-10-CM

## 2025-05-29 DIAGNOSIS — J67.9 HYPERSENSITIVITY PNEUMONITIS (HCC): ICD-10-CM

## 2025-05-29 DIAGNOSIS — G47.19 EXCESSIVE DAYTIME SLEEPINESS: ICD-10-CM

## 2025-05-29 DIAGNOSIS — Z15.89 MTHFR GENE MUTATION: ICD-10-CM

## 2025-05-29 DIAGNOSIS — R76.8 RHEUMATOID FACTOR POSITIVE: ICD-10-CM

## 2025-05-29 DIAGNOSIS — Q33.0 CYSTIC LUNG, CONGENITAL: Primary | ICD-10-CM

## 2025-05-29 PROCEDURE — 99406 BEHAV CHNG SMOKING 3-10 MIN: CPT | Performed by: INTERNAL MEDICINE

## 2025-05-29 PROCEDURE — G8419 CALC BMI OUT NRM PARAM NOF/U: HCPCS | Performed by: INTERNAL MEDICINE

## 2025-05-29 PROCEDURE — 99213 OFFICE O/P EST LOW 20 MIN: CPT | Performed by: INTERNAL MEDICINE

## 2025-05-29 PROCEDURE — 99214 OFFICE O/P EST MOD 30 MIN: CPT | Performed by: INTERNAL MEDICINE

## 2025-05-29 PROCEDURE — G8427 DOCREV CUR MEDS BY ELIG CLIN: HCPCS | Performed by: INTERNAL MEDICINE

## 2025-05-29 PROCEDURE — 4004F PT TOBACCO SCREEN RCVD TLK: CPT | Performed by: INTERNAL MEDICINE

## 2025-05-29 NOTE — PROGRESS NOTES
CK      2. Tobacco use  Z72.0       3. MTHFR gene mutation  Z15.89       4. STARLA-1 4G/4G genotype  Z15.89       5. Chronic cough  R05.3       6. Lung nodules  R91.8       7. Excessive daytime sleepiness  G47.19       8. Hypersensitivity pneumonitis (HCC)  J67.9       9. Rheumatoid factor positive  R76.8 [facility] - Dhiraj Ordoñez, , Rheumatology, [place]     Sedimentation Rate     Rheumatoid Factor     Cyclic Citrul Peptide Antibody, IgG     CK        PLAN:  1.) Congenital Cystic Lung Disease  - VEGF-D, FLCN Gene mutation, Immunoglobulins, HP panel, HIV, Hepatitis Panel, CD1a, 1,3 BD Glucan, RF, ESR, CRP, REGIS, ANCA, Chlamydophilia IGM, IGG  - differential to include:  (1) DELATORRE - unremarkable   (2) Brittni Nick Marina Syndrome - FLCN gene WNL  (3) LIP - no biopsy attained   (4) HIV/Hepatitis - HIV and Hepatitis negative   (5) Hypersensitivity Pneumonitis - patient had cockatiel and chickens, likely   (6) Langerhans Cell Histiocytosis - unlikely     - Ig unremarkable for defect  - ESR 25  - RF 27  - re check ESR, RF, CCP ab, CK  - patient has persistent hand pains  - patient instructed not to reinaldo dive or deep sea dive as variations in barometric pressure can cause flex in the cysts which can pop the cysts  - tessalon perles for cough  - 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, left neck   - stop smoking     2.) Tobacco Use:  - active smoker 1 PPD  - on chantix in past  - tobacco cessation counseling given to patient for 4 minutes   - benefits of smoking cessation discussed with patient and harms of continued smoking discussed with patient   - patient understood risks of continued smoking and benefits of smoking cessation   - benefits of smoking cessation discussed with patient and harms of continued smoking discussed with patient   - patient understood risks of continued smoking and benefits of smoking cessation     3.) MTHFR Gene Mutation:  - no long term anticoagulation   - previous anticoagulation

## 2025-05-30 ENCOUNTER — HOSPITAL ENCOUNTER (OUTPATIENT)
Age: 40
Discharge: HOME | End: 2025-05-30
Payer: COMMERCIAL

## 2025-05-30 DIAGNOSIS — R76.8 RHEUMATOID FACTOR POSITIVE: ICD-10-CM

## 2025-05-30 DIAGNOSIS — Q33.0 CYSTIC LUNG, CONGENITAL: ICD-10-CM

## 2025-05-30 LAB
CK SERPL-CCNC: 62 U/L (ref 20–180)
ERYTHROCYTE [SEDIMENTATION RATE] IN BLOOD BY WESTERGREN METHOD: 14 MM/HR (ref 0–20)

## 2025-05-30 PROCEDURE — 86431 RHEUMATOID FACTOR QUANT: CPT

## 2025-05-30 PROCEDURE — 82550 ASSAY OF CK (CPK): CPT

## 2025-05-30 PROCEDURE — 85652 RBC SED RATE AUTOMATED: CPT

## 2025-05-30 PROCEDURE — 86200 CCP ANTIBODY: CPT

## 2025-05-30 PROCEDURE — 36415 COLL VENOUS BLD VENIPUNCTURE: CPT

## 2025-05-31 LAB — RHEUMATOID FACT SER NEPH-ACNC: 13 IU/ML (ref 0–14)

## 2025-06-03 LAB — CCP AB SER IA-ACNC: 1.4 U/ML (ref 0–7)

## 2025-08-01 ENCOUNTER — TELEPHONE (OUTPATIENT)
Age: 40
End: 2025-08-01

## 2025-08-01 NOTE — TELEPHONE ENCOUNTER
Call to pt to request change of appt with Dr Turner. Next week appt 8/5/25 moved to 8/6/25@1130am.

## 2025-08-06 ENCOUNTER — OFFICE VISIT (OUTPATIENT)
Age: 40
End: 2025-08-06
Payer: COMMERCIAL

## 2025-08-06 VITALS
TEMPERATURE: 97.7 F | DIASTOLIC BLOOD PRESSURE: 92 MMHG | HEART RATE: 92 BPM | RESPIRATION RATE: 18 BRPM | OXYGEN SATURATION: 99 % | SYSTOLIC BLOOD PRESSURE: 133 MMHG

## 2025-08-06 DIAGNOSIS — Z72.0 TOBACCO USE: ICD-10-CM

## 2025-08-06 DIAGNOSIS — R91.8 LUNG NODULES: ICD-10-CM

## 2025-08-06 DIAGNOSIS — Z15.89 MTHFR GENE MUTATION: ICD-10-CM

## 2025-08-06 DIAGNOSIS — R76.8 RHEUMATOID FACTOR POSITIVE: ICD-10-CM

## 2025-08-06 DIAGNOSIS — Z15.89 PAI-1 4G/4G GENOTYPE: ICD-10-CM

## 2025-08-06 DIAGNOSIS — J67.9 HYPERSENSITIVITY PNEUMONITIS (HCC): ICD-10-CM

## 2025-08-06 DIAGNOSIS — R05.3 CHRONIC COUGH: ICD-10-CM

## 2025-08-06 DIAGNOSIS — Q33.0 CONGENITAL CYSTIC DISEASE OF LUNG: Primary | ICD-10-CM

## 2025-08-06 DIAGNOSIS — G47.19 EXCESSIVE DAYTIME SLEEPINESS: ICD-10-CM

## 2025-08-06 PROCEDURE — G8419 CALC BMI OUT NRM PARAM NOF/U: HCPCS | Performed by: INTERNAL MEDICINE

## 2025-08-06 PROCEDURE — G8427 DOCREV CUR MEDS BY ELIG CLIN: HCPCS | Performed by: INTERNAL MEDICINE

## 2025-08-06 PROCEDURE — 1036F TOBACCO NON-USER: CPT | Performed by: INTERNAL MEDICINE

## 2025-08-06 PROCEDURE — 99214 OFFICE O/P EST MOD 30 MIN: CPT | Performed by: INTERNAL MEDICINE

## 2025-08-06 PROCEDURE — 99213 OFFICE O/P EST LOW 20 MIN: CPT | Performed by: INTERNAL MEDICINE
